# Patient Record
Sex: MALE | Race: WHITE | NOT HISPANIC OR LATINO | Employment: UNEMPLOYED | URBAN - METROPOLITAN AREA
[De-identification: names, ages, dates, MRNs, and addresses within clinical notes are randomized per-mention and may not be internally consistent; named-entity substitution may affect disease eponyms.]

---

## 2025-07-07 ENCOUNTER — APPOINTMENT (OUTPATIENT)
Dept: RADIOLOGY | Facility: HOSPITAL | Age: 35
End: 2025-07-07
Payer: COMMERCIAL

## 2025-07-07 ENCOUNTER — HOSPITAL ENCOUNTER (EMERGENCY)
Facility: HOSPITAL | Age: 35
Discharge: HOME | End: 2025-07-07
Payer: COMMERCIAL

## 2025-07-07 VITALS
HEIGHT: 68 IN | SYSTOLIC BLOOD PRESSURE: 147 MMHG | DIASTOLIC BLOOD PRESSURE: 81 MMHG | TEMPERATURE: 97.9 F | OXYGEN SATURATION: 96 % | BODY MASS INDEX: 28.79 KG/M2 | HEART RATE: 76 BPM | WEIGHT: 190 LBS | RESPIRATION RATE: 18 BRPM

## 2025-07-07 DIAGNOSIS — M79.672 ACUTE FOOT PAIN, LEFT: Primary | ICD-10-CM

## 2025-07-07 DIAGNOSIS — S90.32XA CONTUSION OF LEFT FOOT, INITIAL ENCOUNTER: ICD-10-CM

## 2025-07-07 PROCEDURE — 99284 EMERGENCY DEPT VISIT MOD MDM: CPT

## 2025-07-07 PROCEDURE — 96372 THER/PROPH/DIAG INJ SC/IM: CPT

## 2025-07-07 PROCEDURE — 73630 X-RAY EXAM OF FOOT: CPT | Mod: LT

## 2025-07-07 PROCEDURE — 73630 X-RAY EXAM OF FOOT: CPT | Mod: LEFT SIDE | Performed by: STUDENT IN AN ORGANIZED HEALTH CARE EDUCATION/TRAINING PROGRAM

## 2025-07-07 PROCEDURE — 2500000004 HC RX 250 GENERAL PHARMACY W/ HCPCS (ALT 636 FOR OP/ED)

## 2025-07-07 RX ORDER — NAPROXEN SODIUM 550 MG/1
550 TABLET ORAL
Qty: 20 TABLET | Refills: 0 | Status: SHIPPED | OUTPATIENT
Start: 2025-07-07 | End: 2025-07-17

## 2025-07-07 RX ORDER — KETOROLAC TROMETHAMINE 30 MG/ML
15 INJECTION, SOLUTION INTRAMUSCULAR; INTRAVENOUS ONCE
Status: COMPLETED | OUTPATIENT
Start: 2025-07-07 | End: 2025-07-07

## 2025-07-07 RX ADMIN — KETOROLAC TROMETHAMINE 15 MG: 30 INJECTION, SOLUTION INTRAMUSCULAR at 19:14

## 2025-07-07 ASSESSMENT — LIFESTYLE VARIABLES
TOTAL SCORE: 0
HAVE PEOPLE ANNOYED YOU BY CRITICIZING YOUR DRINKING: NO
EVER FELT BAD OR GUILTY ABOUT YOUR DRINKING: NO
HAVE YOU EVER FELT YOU SHOULD CUT DOWN ON YOUR DRINKING: NO
EVER HAD A DRINK FIRST THING IN THE MORNING TO STEADY YOUR NERVES TO GET RID OF A HANGOVER: NO

## 2025-07-07 ASSESSMENT — PAIN - FUNCTIONAL ASSESSMENT: PAIN_FUNCTIONAL_ASSESSMENT: 0-10

## 2025-07-07 ASSESSMENT — PAIN DESCRIPTION - LOCATION: LOCATION: FOOT

## 2025-07-07 ASSESSMENT — PAIN SCALES - GENERAL: PAINLEVEL_OUTOF10: 7

## 2025-07-07 ASSESSMENT — PAIN DESCRIPTION - ORIENTATION: ORIENTATION: LEFT

## 2025-07-07 ASSESSMENT — PAIN DESCRIPTION - PAIN TYPE: TYPE: ACUTE PAIN

## 2025-07-08 NOTE — ED PROVIDER NOTES
HPI   Chief Complaint   Patient presents with    Foot Injury     Dropped a tire ramp on left foot yesterday       34-year-old male presents to the emergency department for evaluation of left great toe pain following an injury that occurred yesterday.  Patient tells me he dropped a tire ramp onto his left foot.  States that his pain is at the base of his great toe predominantly, nonradiating, sharp, worse with walking.  States he is able to walk however tries to keep the weight off that area of his foot.  Denies paresthesias, numbness, but does believe he is got decreased range of motion of his left great toe.  Does report to taking Tylenol approximately 1130 with no significant improvement in his symptoms.  Denies previous injury.  Denies fever, chills, body aches, chest pain, shortness of breath.  Denies any open wounds.  Denies any other injuries.  Denies any other concerns or symptoms at this time.      History provided by:  Patient   used: No      Patient History   Medical History[1]  Surgical History[2]  Family History[3]  Social History[4]    Physical Exam   ED Triage Vitals   Temperature Heart Rate Respirations BP   07/07/25 1747 07/07/25 1747 07/07/25 1747 07/07/25 1747   36.6 °C (97.9 °F) 86 18 122/79      Pulse Ox Temp Source Heart Rate Source Patient Position   07/07/25 1747 07/07/25 1747 07/07/25 1747 07/07/25 1943   95 % Temporal Monitor Sitting      BP Location FiO2 (%)     07/07/25 1943 --     Left arm        Physical Exam  Nursing notes reviewed and confirmed by me.  Chart review performed including medications, allergies, and medical, surgical, and family history    Constitutional: Vital signs per nursing notes.  Well developed, well nourished.  No acute distress.    Psychiatric: no abnormalities of mood or affect   Eyes: PERRL; conjunctivae and lids normal; EOMI  HENT: Head is normocephalic, atraumatic. External ears normal in appearance without drainage.  Nose is without  deformity or drainage.  Posterior oropharynx without edema or erythema.  Moist mucous membranes.  Neck: neck supple, no meningismus signs or rigidity.  trachea midline without deviation.   Respiratory: Breath sounds clear bilaterally no wheezes rales or rhonchi.  No respiratory distress.  Normal respiratory rate/effort.    Cardiovascular: regular rate and rhythm; no murmurs.   distal pulses intact throughout.  Neurological: normal speech patient is alert and oriented x3.  Patient able to move extremities.  Grossly intact strength and sensation of upper and lower extremities.  No focal neurologic deficits appreciated on exam.  GI: Abdomen is soft nontender.  No rebound, rigidity, or guarding.  No masses or hernias appreciated.  No organomegaly.  Lymphatic: no significant lymphadenopathy appreciated  Musculoskeletal: Patient able to move all extremities.  No deformities or swelling appreciated.  No calf tenderness or edema.  Tenderness palpation at the base of his left great toe.  No significant erythema, swelling, warmth or masses.  No palpable bony abnormality.  No crepitus.  No significant antalgic gait.  Skin:  no rash or erythema.  No wounds. Normal capillary refill.    ED Course & MDM   Diagnoses as of 07/07/25 2106   Acute foot pain, left   Contusion of left foot, initial encounter     Labs Reviewed - No data to display     XR foot left 3+ views   Final Result   No acute osseous abnormality.             MACRO:   None.        Signed by: Roderick Horne 7/7/2025 7:23 PM   Dictation workstation:   [id number]          Medical Decision Making  Differential diagnoses considered but not limited to: Fracture, dislocation, contusion    34-year-old male presents to the emergency department for evaluation of left great toe pain following an injury that occurred yesterday.  On presentation /79, afebrile, HR 86, RR 18, SpO2 95%.  Patient is nontoxic appearing, in no acute distress. Heart sounds normal with regular  rate and rhythm.  Lungs clear to auscultation bilaterally with no adventitious sounds.  Abdomen is soft and nontender, with no rebound or guarding.  No CVA tenderness.  Negative Homans' sign bilaterally. Neurovascularly intact, Brisk capillary refill. Strong and equal pulses throughout. Sensation intact.  No significant antalgic gait.  He does have tenderness palpation at the base of his left great toe.  There is no significant erythema, swelling, warmth or masses.  No palpable bony abnormality.  No crepitus.  Compartment is soft.  Patient was initially treated with 15 mg IM Toradol.  X-rays were obtained.    X-rays of the left foot were negative for acute fracture or malalignment.  Lisfranc joint is intact.  Enthesopathic spurring at the Achilles tendon insertion.  Reevaluated patient, no significant improvement in his symptoms.  I do believe patient is experiencing symptoms related to a contusion of his foot.  He is neurovascularly intact.  An Ace wrap was applied, patient was put in a postop boot, and given crutches.  I did advise weightbearing as tolerated.  He was given a prescription for Anaprox and educated on its usage.  I did advise supportive care with Tylenol and Anaprox as well as ice and rest.  Did give him the [facility] for [facility] and advised that he follow-up in the next 2 to 3 days if he feels his symptoms have not improved.  Advised that he follow-up with his PCP within a week.  He was however given strict return precautions with any new or worsening symptoms.    As a result of the work-up, the patient was discharged home.  he was informed of his clinical impression, educated on imaging findings, I explained reasons for the patient to return to the Emergency Department and instructed to come back with any concerns or worsening of condition.  he demonstrated verbal understanding and were in agreement with the plan of care.  I emphasized the importance of follow up with the  physician I referred them to in the timeframe recommended.  he was given the opportunity to ask questions.  All of the patient's questions were answered.  Patient discharged in good stable condition.    Amount and/or Complexity of Data Reviewed  Radiology: ordered. Decision-making details documented in ED Course.               [1] History reviewed. No pertinent past medical history.  [2] History reviewed. No pertinent surgical history.  [3] No family history on file.  [4]   Social History  Tobacco Use    Smoking status: Never    Smokeless tobacco: Never   Vaping Use    Vaping status: Every Day   Substance Use Topics    Alcohol use: Never    Drug use: Never        Rodolfo Epps PA-C  07/08/25 0103